# Patient Record
Sex: FEMALE | Race: WHITE | NOT HISPANIC OR LATINO | Employment: FULL TIME | URBAN - NONMETROPOLITAN AREA
[De-identification: names, ages, dates, MRNs, and addresses within clinical notes are randomized per-mention and may not be internally consistent; named-entity substitution may affect disease eponyms.]

---

## 2024-02-19 ENCOUNTER — OFFICE VISIT (OUTPATIENT)
Dept: PRIMARY CARE | Facility: CLINIC | Age: 34
End: 2024-02-19
Payer: COMMERCIAL

## 2024-02-19 VITALS
DIASTOLIC BLOOD PRESSURE: 73 MMHG | OXYGEN SATURATION: 95 % | SYSTOLIC BLOOD PRESSURE: 104 MMHG | WEIGHT: 153.2 LBS | BODY MASS INDEX: 28.19 KG/M2 | HEIGHT: 62 IN | HEART RATE: 79 BPM

## 2024-02-19 DIAGNOSIS — R11.0 NAUSEA: ICD-10-CM

## 2024-02-19 DIAGNOSIS — A60.04 HERPES SIMPLEX VULVOVAGINITIS: ICD-10-CM

## 2024-02-19 DIAGNOSIS — K52.9 CHRONIC DIARRHEA: Primary | ICD-10-CM

## 2024-02-19 PROCEDURE — 1036F TOBACCO NON-USER: CPT | Performed by: STUDENT IN AN ORGANIZED HEALTH CARE EDUCATION/TRAINING PROGRAM

## 2024-02-19 PROCEDURE — 99214 OFFICE O/P EST MOD 30 MIN: CPT | Performed by: STUDENT IN AN ORGANIZED HEALTH CARE EDUCATION/TRAINING PROGRAM

## 2024-02-19 RX ORDER — ONDANSETRON HYDROCHLORIDE 8 MG/1
8 TABLET, FILM COATED ORAL EVERY 8 HOURS PRN
Qty: 20 TABLET | Refills: 1 | Status: SHIPPED | OUTPATIENT
Start: 2024-02-19

## 2024-02-19 RX ORDER — ONDANSETRON HYDROCHLORIDE 8 MG/1
8 TABLET, FILM COATED ORAL EVERY 8 HOURS PRN
COMMUNITY
End: 2024-02-19 | Stop reason: SDUPTHER

## 2024-02-19 RX ORDER — PANCRELIPASE LIPASE, PANCRELIPASE AMYLASE, AND PANCRELIPASE PROTEASE 149900; 37000; 97300 [USP'U]/1; [USP'U]/1; [USP'U]/1
1 CAPSULE, DELAYED RELEASE ORAL 3 TIMES DAILY
Qty: 30 CAPSULE | Refills: 11 | Status: SHIPPED | OUTPATIENT
Start: 2024-02-19 | End: 2024-03-04 | Stop reason: ALTCHOICE

## 2024-02-19 RX ORDER — VALACYCLOVIR HYDROCHLORIDE 500 MG/1
500 TABLET, FILM COATED ORAL 2 TIMES DAILY
Qty: 6 TABLET | Refills: 0 | Status: SHIPPED | OUTPATIENT
Start: 2024-02-19 | End: 2024-02-19 | Stop reason: SDUPTHER

## 2024-02-19 RX ORDER — VALACYCLOVIR HYDROCHLORIDE 1 G/1
1000 TABLET, FILM COATED ORAL DAILY
Qty: 7 TABLET | Refills: 2 | Status: SHIPPED | OUTPATIENT
Start: 2024-02-19 | End: 2024-03-11

## 2024-02-19 ASSESSMENT — PATIENT HEALTH QUESTIONNAIRE - PHQ9
SUM OF ALL RESPONSES TO PHQ9 QUESTIONS 1 AND 2: 0
1. LITTLE INTEREST OR PLEASURE IN DOING THINGS: NOT AT ALL
2. FEELING DOWN, DEPRESSED OR HOPELESS: NOT AT ALL

## 2024-02-19 NOTE — PROGRESS NOTES
Subjective   Patient ID: Taylor Reinoso is a 33 y.o. female who presents for New PT (PT is here today to establish care as a new pt.).    HPI    Reports she has stomach issues, been told she has IBS. When she wakes up in the morning she has diarrhea and wakes up very nauseated. Has no appetite, and the thought of food makes her sick, been sick multiple times with GI upset. Every time it happens its worse than the time before. 2 weeks ago, started having nausea and a weird taste almost like metal, reports it kept getting worse and worse. For 12 hours she had diarrhea and vomiting back to back.    Fiber- every morning   Protein before caffeine   Cut back breads  Trying to drink more fluids - 16 Oz -Half gallon    Currently wants to get Zofran prescription. I suggested further eval.    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      Has history of genital herpes.  She takes valacyclovir with flareups.  Last flareup was a month ago.  Takes for 7 days.  Refill requested.  Sent refill.    Review of Systems  ROS negative except discussed above in HPI.    Vitals:    02/19/24 1616   BP: 104/73   Pulse: 79   SpO2: 95%     Objective   Physical Exam  Constitutional:       Appearance: Normal appearance.   Abdominal:      General: Abdomen is flat.      Palpations: Abdomen is soft.   Neurological:      Mental Status: She is alert.           Assessment/Plan   Taylor was seen today for new pt.  Diagnoses and all orders for this visit:  Chronic diarrhea (Primary)  -     Amylase; Future  -     Lipase; Future  -     Calprotectin Stool; Future  -     Stool Pathogen Panel, PCR  -     H. pylori antigen, stool; Future  -     lipase-protease-amylase (Pancreaze) 37,000-97,300- 149,900 unit capsule,delayed release(DR/EC); Take 1 capsule by mouth 3 times a day.  Nausea  -     ondansetron (Zofran) 8 mg tablet; Take 1 tablet (8 mg) by mouth every 8 hours if needed for nausea or vomiting.  Herpes simplex vulvovaginitis  -      valACYclovir (Valtrex) 1 gram tablet; Take 1 tablet (1,000 mg) by mouth once daily for 21 days.      Follow up as needed.          Delonte Esquivel MD MPH

## 2024-02-23 PROCEDURE — 87449 NOS EACH ORGANISM AG IA: CPT | Performed by: STUDENT IN AN ORGANIZED HEALTH CARE EDUCATION/TRAINING PROGRAM

## 2024-02-27 LAB — H PYLORI AG STL QL IA: NEGATIVE

## 2024-03-04 ENCOUNTER — OFFICE VISIT (OUTPATIENT)
Dept: PRIMARY CARE | Facility: CLINIC | Age: 34
End: 2024-03-04
Payer: COMMERCIAL

## 2024-03-04 VITALS
OXYGEN SATURATION: 97 % | SYSTOLIC BLOOD PRESSURE: 100 MMHG | BODY MASS INDEX: 28.3 KG/M2 | WEIGHT: 152.8 LBS | DIASTOLIC BLOOD PRESSURE: 60 MMHG | HEART RATE: 78 BPM

## 2024-03-04 DIAGNOSIS — K59.00 CONSTIPATION, UNSPECIFIED CONSTIPATION TYPE: Primary | ICD-10-CM

## 2024-03-04 DIAGNOSIS — K52.9 CHRONIC DIARRHEA: ICD-10-CM

## 2024-03-04 PROCEDURE — 1036F TOBACCO NON-USER: CPT | Performed by: STUDENT IN AN ORGANIZED HEALTH CARE EDUCATION/TRAINING PROGRAM

## 2024-03-04 PROCEDURE — 99213 OFFICE O/P EST LOW 20 MIN: CPT | Performed by: STUDENT IN AN ORGANIZED HEALTH CARE EDUCATION/TRAINING PROGRAM

## 2024-03-04 RX ORDER — PANCRELIPASE LIPASE, PANCRELIPASE AMYLASE, AND PANCRELIPASE PROTEASE 4200; 24600; 14200 [USP'U]/1; [USP'U]/1; [USP'U]/1
1 CAPSULE, DELAYED RELEASE ORAL
Qty: 30 CAPSULE | Refills: 0 | Status: SHIPPED | OUTPATIENT
Start: 2024-03-04 | End: 2024-05-20

## 2024-03-04 ASSESSMENT — PATIENT HEALTH QUESTIONNAIRE - PHQ9
1. LITTLE INTEREST OR PLEASURE IN DOING THINGS: NOT AT ALL
2. FEELING DOWN, DEPRESSED OR HOPELESS: NOT AT ALL
SUM OF ALL RESPONSES TO PHQ9 QUESTIONS 1 AND 2: 0

## 2024-03-04 NOTE — PROGRESS NOTES
Subjective   Patient ID: Taylor Reinoso is a 33 y.o. female who presents for Follow-up (P ).    HPI    T is here today for 2 week ov, was not able to get blood work done, due to her work schedule. Today has been the first day she has had a BM since Friday, the BM that was coming out was covered in a think layer of bright red blood with about 3-4 clots this morning. States the BM is formed in formed hard sharp. Has never had blood in stool before. after last appointment it was extreme pain when she went BM and that was the first time she noticed lots of blood and clots in her stool. Patient has tried fiber supplements, Dulcolax, stool softeners, suppositories and enemas in the past without significant improvement in her symptoms.  Prescribing Linzess to help with her constipation.    Review of Systems  ROS negative except discussed above in HPI.    Vitals:    03/04/24 1648   BP: 100/60   Pulse: 78   SpO2: 97%     Objective   Physical Exam  Constitutional:       Appearance: Normal appearance.   Neurological:      Mental Status: She is alert.           Assessment/Plan   Taylor was seen today for follow-up.  Diagnoses and all orders for this visit:  Constipation, unspecified constipation type (Primary)  -     lipase-protease-amylase (Pancreaze) 4,200-14,200- 24,600 unit capsule; Take 1 capsule by mouth 3 times a day with meals for 10 days.  -     linaCLOtide (Linzess) 145 mcg capsule; Take 1 capsule (145 mcg) by mouth once daily in the morning. Take before meals. Do not crush or chew.  Chronic diarrhea  -     H. pylori antigen, stool  Prescribing linzess  If not approved then can try amtiza    Follow up in 3 months.        kiran Esquivel MD MPH

## 2024-03-06 ENCOUNTER — LAB (OUTPATIENT)
Dept: LAB | Facility: LAB | Age: 34
End: 2024-03-06
Payer: COMMERCIAL

## 2024-03-06 DIAGNOSIS — K52.9 CHRONIC DIARRHEA: ICD-10-CM

## 2024-03-06 LAB
AMYLASE SERPL-CCNC: 41 U/L (ref 29–103)
LIPASE SERPL-CCNC: 13 U/L (ref 9–82)

## 2024-03-06 PROCEDURE — 82150 ASSAY OF AMYLASE: CPT

## 2024-03-06 PROCEDURE — 83690 ASSAY OF LIPASE: CPT

## 2024-03-06 PROCEDURE — 36415 COLL VENOUS BLD VENIPUNCTURE: CPT

## 2024-03-07 ENCOUNTER — TELEPHONE (OUTPATIENT)
Dept: PRIMARY CARE | Facility: CLINIC | Age: 34
End: 2024-03-07
Payer: COMMERCIAL

## 2024-03-07 NOTE — TELEPHONE ENCOUNTER
Silvina at [facility] stating the pancreaze only comes in a quantity of 100 and they don't break boxes. Asking for a call back to let them know if that quantity is ok. [phone]

## 2024-03-07 NOTE — TELEPHONE ENCOUNTER
Patient called in and stated that all of her bloodwork came back normal and was wondering if she should still take the pancreaze that was prescribed.    Thank

## 2024-03-12 ENCOUNTER — TELEPHONE (OUTPATIENT)
Dept: PRIMARY CARE | Facility: CLINIC | Age: 34
End: 2024-03-12
Payer: COMMERCIAL

## 2024-03-12 NOTE — TELEPHONE ENCOUNTER
Asking if the RX for the pancreaze can be changed to a quantity of 100. The only ones they are for a quantity of 100 and they don't break the boxes. Can call [phone].

## 2024-03-14 DIAGNOSIS — K52.9 CHRONIC DIARRHEA: ICD-10-CM

## 2024-03-14 DIAGNOSIS — R11.0 NAUSEA: Primary | ICD-10-CM

## 2024-03-14 RX ORDER — ONDANSETRON 4 MG/1
4 TABLET, ORALLY DISINTEGRATING ORAL EVERY 8 HOURS PRN
Qty: 20 TABLET | Refills: 0 | Status: SHIPPED | OUTPATIENT
Start: 2024-03-14 | End: 2024-03-21

## 2024-04-04 DIAGNOSIS — A60.04 HERPES SIMPLEX VULVOVAGINITIS: Primary | ICD-10-CM

## 2024-04-04 RX ORDER — VALACYCLOVIR HYDROCHLORIDE 500 MG/1
500 TABLET, FILM COATED ORAL 2 TIMES DAILY
Qty: 60 TABLET | Refills: 0 | Status: SHIPPED | OUTPATIENT
Start: 2024-04-04 | End: 2024-05-04

## 2024-04-04 RX ORDER — VALACYCLOVIR HYDROCHLORIDE 1 G/1
500 TABLET, FILM COATED ORAL 2 TIMES DAILY
COMMUNITY
Start: 2024-03-25 | End: 2024-04-04 | Stop reason: SDUPTHER

## 2024-05-20 ENCOUNTER — OFFICE VISIT (OUTPATIENT)
Dept: GASTROENTEROLOGY | Facility: CLINIC | Age: 34
End: 2024-05-20
Payer: COMMERCIAL

## 2024-05-20 VITALS — BODY MASS INDEX: 27.41 KG/M2 | HEIGHT: 61 IN | WEIGHT: 145.2 LBS

## 2024-05-20 DIAGNOSIS — R11.0 NAUSEA: ICD-10-CM

## 2024-05-20 DIAGNOSIS — K52.9 CHRONIC DIARRHEA: ICD-10-CM

## 2024-05-20 DIAGNOSIS — R19.7 DIARRHEA DUE TO MALABSORPTION (HHS-HCC): Primary | ICD-10-CM

## 2024-05-20 DIAGNOSIS — K90.9 DIARRHEA DUE TO MALABSORPTION (HHS-HCC): Primary | ICD-10-CM

## 2024-05-20 PROCEDURE — 99213 OFFICE O/P EST LOW 20 MIN: CPT | Performed by: INTERNAL MEDICINE

## 2024-05-20 PROCEDURE — 1036F TOBACCO NON-USER: CPT | Performed by: INTERNAL MEDICINE

## 2024-05-20 RX ORDER — L.ACIDOPH/B.ANIMALIS/B.LONGUM 15B CELL
1 CAPSULE ORAL DAILY
Qty: 90 CAPSULE | Refills: 3 | Status: SHIPPED | OUTPATIENT
Start: 2024-05-20 | End: 2025-05-20

## 2024-05-20 ASSESSMENT — ENCOUNTER SYMPTOMS
DIARRHEA: 1
ENDOCRINE NEGATIVE: 1
CONSTITUTIONAL NEGATIVE: 1
CARDIOVASCULAR NEGATIVE: 1
NAUSEA: 1
HEMATOLOGIC/LYMPHATIC NEGATIVE: 1
EYES NEGATIVE: 1
RESPIRATORY NEGATIVE: 1
ABDOMINAL PAIN: 1
NEUROLOGICAL NEGATIVE: 1

## 2024-05-20 NOTE — PROGRESS NOTES
Subjective   Patient ID: Taylor Reinoso is a 33 y.o. female who presents for Nausea (With vomiting starting in October n/v worse in the morning. Seen in the ED 2/6 ), Diarrhea, and Constipation.  Diarrhea   Associated symptoms include abdominal pain.   Constipation  Associated symptoms include abdominal pain, diarrhea and nausea.     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  History of diarrhea beginning back to adolescence.  Did get improvement with using over-the-counter fiber supplement was good for approximately 2 months and symptoms again recurred.  She is currently still on fiber averages 2-4 bowel movements daily with urgency some incontinence.  Denies any food triggers she is gone on dairy free and gluten-free diet without much improvement.  Admits abdominal cramping and bloating just before defecation.  Worse with stress or emotional triggers.  No prior colonoscopy.  Review of Systems   Constitutional: Negative.    HENT: Negative.     Eyes: Negative.    Respiratory: Negative.     Cardiovascular: Negative.    Gastrointestinal:  Positive for abdominal pain, diarrhea and nausea.   Endocrine: Negative.    Genitourinary: Negative.    Neurological: Negative.    Hematological: Negative.        Objective   Physical Exam  Vitals and nursing note reviewed.   Constitutional:       Appearance: Normal appearance.   HENT:      Head: Normocephalic.      Mouth/Throat:      Mouth: Mucous membranes are moist.      Pharynx: Oropharynx is clear.   Eyes:      Conjunctiva/sclera: Conjunctivae normal.      Pupils: Pupils are equal, round, and reactive to light.   Cardiovascular:      Pulses: Normal pulses.      Heart sounds: Normal heart sounds.   Pulmonary:      Effort: Pulmonary effort is normal.      Breath sounds: Normal breath sounds.   Abdominal:      General: Abdomen is flat. Bowel sounds are normal.      Palpations: Abdomen is soft.   Musculoskeletal:      Cervical back: Normal range of  motion and neck supple.   Skin:     General: Skin is warm and dry.   Neurological:      General: No focal deficit present.      Mental Status: She is alert and oriented to person, place, and time.   Psychiatric:         Behavior: Behavior normal.         Assessment/Plan   Diagnoses and all orders for this visit:  Nausea  -     Referral to Gastroenterology  -     Calprotectin, Fecal; Future  -     Comprehensive Metabolic Panel; Future  -     CBC and Auto Differential; Future  -     Pancreatic Elastase, Fecal; Future  -     Ova/Para + Giardia/Cryptosporidium Antigen; Future  -     Celiac Panel; Future  Chronic diarrhea  -     Referral to Gastroenterology  -     Calprotectin, Fecal; Future  -     Comprehensive Metabolic Panel; Future  -     CBC and Auto Differential; Future  -     Pancreatic Elastase, Fecal; Future  -     Ova/Para + Giardia/Cryptosporidium Antigen; Future  -     Celiac Panel; Future    Recommend we proceed with stool testing if negative begin Xifaxan 550 mg 3 times daily for 14 days for diarrhea predominant IBS will place on FODMAP diet as well follow-up in 2 months       Dilip Temple DO 05/20/24 1:02 PM

## 2024-05-22 ENCOUNTER — LAB (OUTPATIENT)
Dept: LAB | Facility: LAB | Age: 34
End: 2024-05-22
Payer: COMMERCIAL

## 2024-05-22 DIAGNOSIS — R11.0 NAUSEA: ICD-10-CM

## 2024-05-22 DIAGNOSIS — K52.9 CHRONIC DIARRHEA: ICD-10-CM

## 2024-05-22 LAB
ALBUMIN SERPL BCP-MCNC: 4.2 G/DL (ref 3.4–5)
ALP SERPL-CCNC: 47 U/L (ref 33–110)
ALT SERPL W P-5'-P-CCNC: 33 U/L (ref 7–45)
ANION GAP SERPL CALC-SCNC: 10 MMOL/L (ref 10–20)
AST SERPL W P-5'-P-CCNC: 22 U/L (ref 9–39)
BASOPHILS # BLD AUTO: 0.01 X10*3/UL (ref 0–0.1)
BASOPHILS NFR BLD AUTO: 0.2 %
BILIRUB SERPL-MCNC: 0.5 MG/DL (ref 0–1.2)
BUN SERPL-MCNC: 12 MG/DL (ref 6–23)
CALCIUM SERPL-MCNC: 9.1 MG/DL (ref 8.6–10.3)
CHLORIDE SERPL-SCNC: 104 MMOL/L (ref 98–107)
CO2 SERPL-SCNC: 27 MMOL/L (ref 21–32)
CREAT SERPL-MCNC: 0.86 MG/DL (ref 0.5–1.05)
EGFRCR SERPLBLD CKD-EPI 2021: >90 ML/MIN/1.73M*2
EOSINOPHIL # BLD AUTO: 0.08 X10*3/UL (ref 0–0.7)
EOSINOPHIL NFR BLD AUTO: 1.7 %
ERYTHROCYTE [DISTWIDTH] IN BLOOD BY AUTOMATED COUNT: 12.4 % (ref 11.5–14.5)
GLUCOSE SERPL-MCNC: 89 MG/DL (ref 74–99)
HCT VFR BLD AUTO: 39.9 % (ref 36–46)
HGB BLD-MCNC: 13.3 G/DL (ref 12–16)
IMM GRANULOCYTES # BLD AUTO: 0.03 X10*3/UL (ref 0–0.7)
IMM GRANULOCYTES NFR BLD AUTO: 0.7 % (ref 0–0.9)
LYMPHOCYTES # BLD AUTO: 1.13 X10*3/UL (ref 1.2–4.8)
LYMPHOCYTES NFR BLD AUTO: 24.6 %
MCH RBC QN AUTO: 30.7 PG (ref 26–34)
MCHC RBC AUTO-ENTMCNC: 33.3 G/DL (ref 32–36)
MCV RBC AUTO: 92 FL (ref 80–100)
MONOCYTES # BLD AUTO: 0.39 X10*3/UL (ref 0.1–1)
MONOCYTES NFR BLD AUTO: 8.5 %
NEUTROPHILS # BLD AUTO: 2.96 X10*3/UL (ref 1.2–7.7)
NEUTROPHILS NFR BLD AUTO: 64.3 %
NRBC BLD-RTO: 0 /100 WBCS (ref 0–0)
PLATELET # BLD AUTO: 229 X10*3/UL (ref 150–450)
POTASSIUM SERPL-SCNC: 4 MMOL/L (ref 3.5–5.3)
PROT SERPL-MCNC: 6.9 G/DL (ref 6.4–8.2)
RBC # BLD AUTO: 4.33 X10*6/UL (ref 4–5.2)
SODIUM SERPL-SCNC: 137 MMOL/L (ref 136–145)
WBC # BLD AUTO: 4.6 X10*3/UL (ref 4.4–11.3)

## 2024-05-22 PROCEDURE — 80053 COMPREHEN METABOLIC PANEL: CPT

## 2024-05-22 PROCEDURE — 83516 IMMUNOASSAY NONANTIBODY: CPT

## 2024-05-22 PROCEDURE — 85025 COMPLETE CBC W/AUTO DIFF WBC: CPT

## 2024-05-22 PROCEDURE — 36415 COLL VENOUS BLD VENIPUNCTURE: CPT

## 2024-05-23 LAB
GLIADIN PEPTIDE IGA SER IA-ACNC: <1 U/ML
TTG IGA SER IA-ACNC: <1 U/ML

## 2024-05-24 LAB
GLIADIN PEPTIDE IGG SER IA-ACNC: <0.56 FLU (ref 0–4.99)
TTG IGG SER IA-ACNC: <0.82 FLU (ref 0–4.99)

## 2024-05-29 ENCOUNTER — LAB (OUTPATIENT)
Dept: LAB | Facility: LAB | Age: 34
End: 2024-05-29
Payer: COMMERCIAL

## 2024-05-29 DIAGNOSIS — K52.9 CHRONIC DIARRHEA: ICD-10-CM

## 2024-05-29 DIAGNOSIS — R11.0 NAUSEA: ICD-10-CM

## 2024-05-29 PROCEDURE — 87329 GIARDIA AG IA: CPT

## 2024-05-29 PROCEDURE — 82653 EL-1 FECAL QUANTITATIVE: CPT

## 2024-05-29 PROCEDURE — 83993 ASSAY FOR CALPROTECTIN FECAL: CPT

## 2024-05-29 PROCEDURE — 87328 CRYPTOSPORIDIUM AG IA: CPT

## 2024-05-31 LAB
CRYPTOSP AG STL QL IA: NEGATIVE
G LAMBLIA AG STL QL IA: NEGATIVE

## 2024-06-03 LAB
CALPROTECTIN STL-MCNT: 25 UG/G
ELASTASE PANC STL-MCNT: >800 UG/G
O+P STL MICRO: NEGATIVE

## 2024-06-14 ENCOUNTER — TELEPHONE (OUTPATIENT)
Dept: GASTROENTEROLOGY | Facility: CLINIC | Age: 34
End: 2024-06-14
Payer: COMMERCIAL

## 2024-06-14 NOTE — TELEPHONE ENCOUNTER
6/4 LVM  6/13 No VM  6/14 VM full    ----- Message from Dilip Temple DO sent at 5/29/2024  7:41 AM EDT -----  Please let Taylor know that her celiac panel is negative

## 2024-07-24 DIAGNOSIS — A60.04 HERPES SIMPLEX VULVOVAGINITIS: Primary | ICD-10-CM

## 2024-07-24 RX ORDER — VALACYCLOVIR HYDROCHLORIDE 500 MG/1
500 TABLET, FILM COATED ORAL 2 TIMES DAILY
Qty: 60 TABLET | Refills: 0 | Status: SHIPPED | OUTPATIENT
Start: 2024-07-24 | End: 2024-08-23

## 2024-08-05 ENCOUNTER — APPOINTMENT (OUTPATIENT)
Dept: GASTROENTEROLOGY | Facility: CLINIC | Age: 34
End: 2024-08-05
Payer: COMMERCIAL

## 2024-09-04 ENCOUNTER — OFFICE VISIT (OUTPATIENT)
Dept: URGENT CARE | Facility: CLINIC | Age: 34
End: 2024-09-04
Payer: COMMERCIAL

## 2024-09-04 VITALS
WEIGHT: 154 LBS | TEMPERATURE: 97.9 F | HEART RATE: 74 BPM | RESPIRATION RATE: 16 BRPM | OXYGEN SATURATION: 97 % | HEIGHT: 62 IN | BODY MASS INDEX: 28.34 KG/M2 | SYSTOLIC BLOOD PRESSURE: 104 MMHG | DIASTOLIC BLOOD PRESSURE: 60 MMHG

## 2024-09-04 DIAGNOSIS — R53.83 FATIGUE, UNSPECIFIED TYPE: ICD-10-CM

## 2024-09-04 DIAGNOSIS — R05.1 ACUTE COUGH: ICD-10-CM

## 2024-09-04 DIAGNOSIS — J06.9 ACUTE URI: Primary | ICD-10-CM

## 2024-09-04 LAB
POC CORONAVIRUS 2019 BY PCR (COV19): NOT DETECTED
POC FLU A RESULT: NOT DETECTED
POC FLU B RESULT: NOT DETECTED
POC RSV PCR: NOT DETECTED

## 2024-09-04 PROCEDURE — 99213 OFFICE O/P EST LOW 20 MIN: CPT

## 2024-09-04 RX ORDER — AZITHROMYCIN 250 MG/1
TABLET, FILM COATED ORAL
Qty: 6 TABLET | Refills: 0 | Status: SHIPPED | OUTPATIENT
Start: 2024-09-04 | End: 2024-09-09

## 2024-09-04 RX ORDER — BROMPHENIRAMINE MALEATE, PSEUDOEPHEDRINE HYDROCHLORIDE, AND DEXTROMETHORPHAN HYDROBROMIDE 2; 30; 10 MG/5ML; MG/5ML; MG/5ML
2.5 SYRUP ORAL 4 TIMES DAILY PRN
Qty: 120 ML | Refills: 0 | Status: SHIPPED | OUTPATIENT
Start: 2024-09-04 | End: 2024-09-14

## 2024-09-04 NOTE — PROGRESS NOTES
[facility] URGENT CARE [facility] NOTE:      Name: Taylor Reinoso, 34 y.o.    CSN:[id number]   MRN:[id number]    PCP: Delonte Esquivel MD MPH    ALL:    Allergies   Allergen Reactions    Ranitidine Hives    Metoclopramide Hcl Rash       History:    Chief Complaint: Cough (Pt states cough since Friday, pt states fatigue, light headed.)    Encounter Date: 9/4/2024      HPI: The history was obtained from the patient. Taylor is a 34 y.o. female, who presents with a chief complaint of Cough (Pt states cough since Friday, pt states fatigue, light headed.).  Patient complains of cough x 7 days.  She states symptoms began on Thursday where she had a cough.  She states her symptoms seem to improve over Saturday and Sunday and she thought she was getting better.  She states on Monday all of her symptoms came back.  She states the cough is dry.  She endorses fatigue, slight nasal congestion.  She denies feeling lightheaded or dizzy but more so feels like there is so much pressure around her face she feels like she has brain fog and cannot concentrate.  She states overnight last night she did not take her temperature but she felt fevered as she had the sweats and chills.  She has been taking over-the-counter Mucinex which has improved her symptoms.  She denies headaches, nausea, vomiting, sore throat, chest pain, shortness of breath, abdominal pain.    PMHx:    Past Medical History:   Diagnosis Date    Anxiety     Chronic constipation N/a    Chronic diarrhea N/a    Depression     IBS (irritable bowel syndrome)               Current Outpatient Medications   Medication Sig Dispense Refill    azithromycin (Zithromax Z-Kyle) 250 mg tablet Take 2 tablets (500 mg) on  Day 1,  followed by 1 tablet (250 mg) once daily on Days 2 through 5. 6 tablet 0    brompheniramine-pseudoeph-DM 2-30-10 mg/5 mL syrup Take 2.5 mL by mouth 4 times a day as needed for cough or congestion for up to 10 days. 120 mL 0     L.acidoph-B.animalis-B.longum (Florajen Digestion) 15 billion cell capsule Take 1 capsule by mouth once daily. 90 capsule 3    ondansetron (Zofran) 8 mg tablet Take 1 tablet (8 mg) by mouth every 8 hours if needed for nausea or vomiting. 20 tablet 1     No current facility-administered medications for this visit.         PMSx:    Past Surgical History:   Procedure Laterality Date    HYSTERECTOMY      TUBAL LIGATION         Fam Hx:   Family History   Problem Relation Name Age of Onset    Anxiety disorder Mother      Depression Mother      Anxiety disorder Father Vidal Kidd     Depression Father Vidal Kidd     Alcohol abuse Father Vidal Kidd     Diabetes type II Sister      Cancer Paternal Grandfather         SOC. Hx:     Social History     Socioeconomic History    Marital status:      Spouse name: Not on file    Number of children: Not on file    Years of education: Not on file    Highest education level: Not on file   Occupational History    Not on file   Tobacco Use    Smoking status: Former     Types: Cigarettes     Passive exposure: Current    Smokeless tobacco: Never   Vaping Use    Vaping status: Never Used   Substance and Sexual Activity    Alcohol use: Yes     Comment: occasional    Drug use: Never    Sexual activity: Not Currently     Partners: Male   Other Topics Concern    Not on file   Social History Narrative    Not on file     Social Determinants of Health     Financial Resource Strain: Not on file   Food Insecurity: Not on file   Transportation Needs: Not on file   Physical Activity: Not on file   Stress: Not on file   Social Connections: Not on file   Intimate Partner Violence: Not on file   Housing Stability: Not on file         Vitals:    09/04/24 0945   BP: 104/60   Pulse: 74   Resp: 16   Temp: 36.6 °C (97.9 °F)   SpO2: 97%     69.9 kg (154 lb)          Physical Exam  Vitals reviewed.   Constitutional:       General: She is not in acute distress.     Appearance: Normal appearance.  She is ill-appearing.   HENT:      Right Ear: Tympanic membrane, ear canal and external ear normal.      Left Ear: Tympanic membrane, ear canal and external ear normal.      Nose: Nose normal. No congestion or rhinorrhea.      Mouth/Throat:      Mouth: Mucous membranes are moist.      Pharynx: Oropharynx is clear. Postnasal drip present. No posterior oropharyngeal erythema.   Eyes:      Extraocular Movements: Extraocular movements intact.      Conjunctiva/sclera: Conjunctivae normal.      Pupils: Pupils are equal, round, and reactive to light.   Cardiovascular:      Rate and Rhythm: Normal rate and regular rhythm.      Pulses: Normal pulses.      Heart sounds: Normal heart sounds.   Pulmonary:      Effort: Pulmonary effort is normal. No respiratory distress.      Breath sounds: Normal breath sounds. No stridor. No wheezing, rhonchi or rales.   Abdominal:      General: Abdomen is flat.      Palpations: Abdomen is soft.   Musculoskeletal:      Cervical back: Full passive range of motion without pain, normal range of motion and neck supple.   Lymphadenopathy:      Cervical: Cervical adenopathy present.      Right cervical: Superficial cervical adenopathy present.      Left cervical: Superficial cervical adenopathy present.   Skin:     General: Skin is warm.      Capillary Refill: Capillary refill takes less than 2 seconds.      Findings: No rash.   Neurological:      General: No focal deficit present.      Mental Status: She is alert and oriented to person, place, and time.      GCS: GCS eye subscore is 4. GCS verbal subscore is 5. GCS motor subscore is 6.      Cranial Nerves: Cranial nerves 2-12 are intact.      Gait: Gait is intact.   Psychiatric:         Attention and Perception: Attention normal.         Mood and Affect: Mood normal.         Speech: Speech normal.         Behavior: Behavior normal.         Thought Content: Thought content normal.       I did personally review Taylor's past medical history,  surgical history, social history, as well as family history (when relevant).  In this case, I also oversaw the her drug management by reviewing her medication list, allergy list, as well as the medications that I prescribed during the UC course and/or recommended as an out-patient (including possible OTC medications such as acetaminophen, NSAIDs , etc).    After reviewing the items above, I did look at previous medical documentation, such as recent hospitalizations, office visits, and/or recent consultations with PCP/specialist.                          SDOH:   Another factor that I considered in Taylor's care was her Social Determinants of Health (SDOH). During this UC encounter, she did not have social determinants of health. Those SDOH influencing Taylor's care are: none    LABORATORY @ RADIOLOGICAL IMAGING (if done):     Results for orders placed or performed in visit on 09/04/24 (from the past 24 hour(s))   POCT SARS-COV-2/FLU/RSV PCR SYMPTOMATIC manually resulted   Result Value Ref Range    POC Coronavirus 2019, PCR Not Detected Not Detected    POC Flu A Result Not Detected Not Detected    POC Flu B Result Not Detected Not Detected    POC RSV PCR Not Detected Not Detected       UC COURSE/MEDICAL DECISION MAKING:    Taylor is a 34 y.o., who presents with a working diagnosis of   1. Acute URI    2. Acute cough    3. Fatigue, unspecified type      Taylor was seen today for cough.  Diagnoses and all orders for this visit:  Acute URI (Primary)  -     azithromycin (Zithromax Z-Kyle) 250 mg tablet; Take 2 tablets (500 mg) on  Day 1,  followed by 1 tablet (250 mg) once daily on Days 2 through 5.  Acute cough  -     POCT SARS-COV-2/FLU/RSV PCR SYMPTOMATIC manually resulted  -     brompheniramine-pseudoeph-DM 2-30-10 mg/5 mL syrup; Take 2.5 mL by mouth 4 times a day as needed for cough or congestion for up to 10 days.  Fatigue, unspecified type    After my independent evaluation, she appears to have a illness  "likely due to a URI. At this time, there is a no evidence of pneumonia, hypoxia, OM, bacterial bronchitis, bacteremia, or sepsis.     As we discussed, she is to take the antibiotic as directed and is to report to the ER immediately if there is any worsening of her condition, such as increased cough, shortness of breath, persistent fevers, repeated vomiting, dehydration, or if her condition worsens at all.      Radha Redomnd PA-C   Advanced Practice Provider  [facility] URGENT CARE    Please note: Portions of this chart may have been created with Dragon voice recognition software. Occasional wrong-word or \"sound-like\" substitutions may have occurred due to inherent limitations of the voice recognition software. Please excuse any typographical or grammatical errors contained herein. Please read the chart carefully and recognize, using context, where the substitutions have occurred.   "

## 2024-09-04 NOTE — LETTER
September 4, 2024     Patient: Taylor Reinoso   YOB: 1990   Date of Visit: 9/4/2024       To Whom It May Concern:    Taylor Reinoso was seen in my clinic on 9/4/2024 at 9:45 am. Please excuse Taylor for her absence from work on 9/3/24 and 9/4/24. She may return to work on 9/5/24.    If you have any questions or concerns, please don't hesitate to call.         Sincerely,         Anabel Redmond PA-C        CC: No Recipients

## 2024-10-24 ENCOUNTER — TELEPHONE (OUTPATIENT)
Dept: PRIMARY CARE | Facility: CLINIC | Age: 34
End: 2024-10-24
Payer: COMMERCIAL

## 2024-10-24 DIAGNOSIS — A60.04 HERPES SIMPLEX VULVOVAGINITIS: ICD-10-CM

## 2024-10-24 RX ORDER — VALACYCLOVIR HYDROCHLORIDE 500 MG/1
500 TABLET, FILM COATED ORAL 2 TIMES DAILY
Qty: 60 TABLET | Refills: 0 | Status: SHIPPED | OUTPATIENT
Start: 2024-10-24 | End: 2024-11-23

## 2024-11-25 ENCOUNTER — TELEPHONE (OUTPATIENT)
Dept: PRIMARY CARE | Facility: CLINIC | Age: 34
End: 2024-11-25
Payer: COMMERCIAL

## 2024-11-25 DIAGNOSIS — A60.04 HERPES SIMPLEX VULVOVAGINITIS: ICD-10-CM

## 2024-11-25 RX ORDER — VALACYCLOVIR HYDROCHLORIDE 500 MG/1
500 TABLET, FILM COATED ORAL 2 TIMES DAILY
Qty: 60 TABLET | Refills: 0 | Status: SHIPPED | OUTPATIENT
Start: 2024-11-25 | End: 2024-12-25

## 2024-11-25 NOTE — TELEPHONE ENCOUNTER
This will be the last refill she will need to make an appt with someone and establish care with new provider.

## 2024-12-31 DIAGNOSIS — A60.04 HERPES SIMPLEX VULVOVAGINITIS: ICD-10-CM

## 2024-12-31 DIAGNOSIS — R11.0 NAUSEA: ICD-10-CM

## 2024-12-31 RX ORDER — VALACYCLOVIR HYDROCHLORIDE 500 MG/1
500 TABLET, FILM COATED ORAL 2 TIMES DAILY
Qty: 60 TABLET | Refills: 0 | OUTPATIENT
Start: 2024-12-31 | End: 2025-01-30

## 2024-12-31 RX ORDER — ONDANSETRON HYDROCHLORIDE 8 MG/1
8 TABLET, FILM COATED ORAL EVERY 8 HOURS PRN
Qty: 20 TABLET | Refills: 1 | OUTPATIENT
Start: 2024-12-31

## 2024-12-31 NOTE — TELEPHONE ENCOUNTER
I called the patient and let her know she needs to schedule an appointment. She is expecting a call to schedule it.

## 2024-12-31 NOTE — TELEPHONE ENCOUNTER
Patient is scheduled for NP visit with Arturo on 01/14/25. Patient is out of refills and is need of refill of Valtrex 500mg. She wants to know if you are able to call in refill to get her from now until her appt. She needs sent to [facility] in [place] ([address]     Please contact patient to advise.

## 2025-01-14 ENCOUNTER — APPOINTMENT (OUTPATIENT)
Dept: PRIMARY CARE | Facility: CLINIC | Age: 35
End: 2025-01-14
Payer: COMMERCIAL

## 2025-01-14 VITALS
SYSTOLIC BLOOD PRESSURE: 110 MMHG | HEART RATE: 76 BPM | HEIGHT: 62 IN | DIASTOLIC BLOOD PRESSURE: 78 MMHG | BODY MASS INDEX: 27.58 KG/M2 | WEIGHT: 149.9 LBS | OXYGEN SATURATION: 99 %

## 2025-01-14 DIAGNOSIS — R11.0 NAUSEA: ICD-10-CM

## 2025-01-14 DIAGNOSIS — B00.9 HERPES: Primary | ICD-10-CM

## 2025-01-14 PROCEDURE — 99213 OFFICE O/P EST LOW 20 MIN: CPT

## 2025-01-14 PROCEDURE — 1036F TOBACCO NON-USER: CPT

## 2025-01-14 PROCEDURE — 3008F BODY MASS INDEX DOCD: CPT

## 2025-01-14 RX ORDER — ONDANSETRON 4 MG/1
4 TABLET, ORALLY DISINTEGRATING ORAL EVERY 8 HOURS PRN
Qty: 20 TABLET | Refills: 1 | Status: SHIPPED | OUTPATIENT
Start: 2025-01-14 | End: 2025-01-27

## 2025-01-14 RX ORDER — VALACYCLOVIR HYDROCHLORIDE 500 MG/1
500 TABLET, FILM COATED ORAL DAILY
Qty: 90 TABLET | Refills: 3 | Status: SHIPPED | OUTPATIENT
Start: 2025-01-14 | End: 2026-01-14

## 2025-01-14 NOTE — PROGRESS NOTES
"Subjective   Patient ID: Taylor Reinoso is a 34 y.o. female who presents for Establish Care (Pt is here today to establish care and get some medication refills. ).    Patient presents today to establish primary care.  Was previously patient of Dr. Hines's in this office.    Genital herpes: Chronic infection, minimal outbreaks.  First diagnosed in 2013.  Daily Valtrex prescribed.    IBS combination C&D: Patient has long history with gastroenterology, dietary modifications states she is able to control disease with intuitive eating.  Does take as needed Zofran for acute nausea.    Lumbosacral pain: Works in office, wears dress shoes experiencing some right lower lumbar pain and inflammation, declines muscle relaxer.  Did talk about use of ice stretching and acupressure.  Patient will consider massotherapy.  Also consider heel insert as right leg appears to be shorter than the left causing hip misalignment.           Review of Systems   Constitutional:  Negative for chills, diaphoresis, fatigue and unexpected weight change.   HENT:  Negative for dental problem, tinnitus and trouble swallowing.    Eyes:  Negative for visual disturbance.   Respiratory:  Negative for chest tightness and shortness of breath.    Cardiovascular:  Negative for chest pain, palpitations and leg swelling.   Gastrointestinal:  Negative for abdominal pain, constipation, diarrhea, nausea and rectal pain.   Endocrine: Negative for polydipsia, polyphagia and polyuria.   Genitourinary:  Negative for difficulty urinating, frequency and urgency.   Musculoskeletal:  Positive for back pain. Negative for arthralgias and myalgias.   Skin:  Negative for pallor and wound.   Neurological:  Negative for syncope, weakness, numbness and headaches.   Psychiatric/Behavioral:  Negative for suicidal ideas. The patient is not nervous/anxious.        Objective   /78   Pulse 76   Ht 1.565 m (5' 1.6\")   Wt 68 kg (149 lb 14.4 oz)   SpO2 99%   BMI 27.77 " kg/m²     Physical Exam  Vitals and nursing note reviewed.   Constitutional:       General: She is not in acute distress.     Appearance: Normal appearance. She is normal weight.   HENT:      Head: Normocephalic.      Nose: Nose normal.      Mouth/Throat:      Mouth: Mucous membranes are moist.      Pharynx: Oropharynx is clear.   Eyes:      General: No scleral icterus.     Pupils: Pupils are equal, round, and reactive to light.   Cardiovascular:      Rate and Rhythm: Normal rate and regular rhythm.      Pulses: Normal pulses.      Heart sounds: Normal heart sounds.   Pulmonary:      Effort: Pulmonary effort is normal.      Breath sounds: Normal breath sounds.   Abdominal:      General: Bowel sounds are normal. There is no distension.      Palpations: Abdomen is soft.   Musculoskeletal:         General: Normal range of motion.      Cervical back: Normal range of motion.   Skin:     General: Skin is warm and dry.      Capillary Refill: Capillary refill takes less than 2 seconds.      Coloration: Skin is not jaundiced.   Neurological:      General: No focal deficit present.      Mental Status: She is alert and oriented to person, place, and time. Mental status is at baseline.   Psychiatric:         Mood and Affect: Mood normal.         Behavior: Behavior normal.         Thought Content: Thought content normal.         Judgment: Judgment normal.         Assessment/Plan   Diagnoses and all orders for this visit:  Herpes  -     valACYclovir (Valtrex) 500 mg tablet; Take 1 tablet (500 mg) by mouth once daily.  Nausea  -     ondansetron ODT (Zofran-ODT) 4 mg disintegrating tablet; Dissolve 1 tablet (4 mg) in the mouth every 8 hours if needed for nausea or vomiting for up to 13 days.

## 2025-01-15 ASSESSMENT — ENCOUNTER SYMPTOMS
HEADACHES: 0
DIARRHEA: 0
TROUBLE SWALLOWING: 0
BACK PAIN: 1
DIFFICULTY URINATING: 0
NERVOUS/ANXIOUS: 0
RECTAL PAIN: 0
POLYDIPSIA: 0
MYALGIAS: 0
FREQUENCY: 0
UNEXPECTED WEIGHT CHANGE: 0
ARTHRALGIAS: 0
CHILLS: 0
NUMBNESS: 0
SHORTNESS OF BREATH: 0
CONSTIPATION: 0
WEAKNESS: 0
DIAPHORESIS: 0
POLYPHAGIA: 0
ABDOMINAL PAIN: 0
FATIGUE: 0
NAUSEA: 0
WOUND: 0
PALPITATIONS: 0
CHEST TIGHTNESS: 0

## 2025-03-18 DIAGNOSIS — M54.50 ACUTE LOW BACK PAIN WITHOUT SCIATICA, UNSPECIFIED BACK PAIN LATERALITY: Primary | ICD-10-CM

## 2025-04-10 ENCOUNTER — HOSPITAL ENCOUNTER (OUTPATIENT)
Dept: RADIOLOGY | Facility: CLINIC | Age: 35
Discharge: HOME | End: 2025-04-10
Payer: COMMERCIAL

## 2025-04-10 DIAGNOSIS — M54.50 ACUTE LOW BACK PAIN WITHOUT SCIATICA, UNSPECIFIED BACK PAIN LATERALITY: ICD-10-CM

## 2025-04-10 PROCEDURE — 72100 X-RAY EXAM L-S SPINE 2/3 VWS: CPT

## 2025-04-16 ENCOUNTER — TELEPHONE (OUTPATIENT)
Dept: PRIMARY CARE | Facility: CLINIC | Age: 35
End: 2025-04-16
Payer: COMMERCIAL

## 2025-04-16 DIAGNOSIS — M54.16 LUMBAR RADICULOPATHY: Primary | ICD-10-CM

## 2025-04-25 ENCOUNTER — APPOINTMENT (OUTPATIENT)
Dept: PHYSICAL THERAPY | Facility: CLINIC | Age: 35
End: 2025-04-25
Payer: COMMERCIAL

## 2025-04-25 NOTE — PROGRESS NOTES
Physical Therapy                 Therapy Communication Note    Patient Name: Taylor Reinoso  MRN: [id number]  Department:   Room: Room/bed info not found  Today's Date: 4/25/2025     Discipline: Physical Therapy          Missed Visit Reason:      Missed Time: Cancel    Comment:

## 2025-05-06 ENCOUNTER — EVALUATION (OUTPATIENT)
Dept: PHYSICAL THERAPY | Facility: CLINIC | Age: 35
End: 2025-05-06
Payer: COMMERCIAL

## 2025-05-06 DIAGNOSIS — M54.16 LUMBAR RADICULOPATHY: Primary | ICD-10-CM

## 2025-05-06 PROCEDURE — 97161 PT EVAL LOW COMPLEX 20 MIN: CPT | Mod: GP

## 2025-05-06 PROCEDURE — 97110 THERAPEUTIC EXERCISES: CPT | Mod: GP

## 2025-05-06 ASSESSMENT — ENCOUNTER SYMPTOMS
OCCASIONAL FEELINGS OF UNSTEADINESS: 0
LOSS OF SENSATION IN FEET: 0
DEPRESSION: 0

## 2025-05-06 ASSESSMENT — PAIN - FUNCTIONAL ASSESSMENT: PAIN_FUNCTIONAL_ASSESSMENT: 0-10

## 2025-05-06 ASSESSMENT — PAIN SCALES - GENERAL: PAINLEVEL_OUTOF10: 8

## 2025-05-06 ASSESSMENT — PATIENT HEALTH QUESTIONNAIRE - PHQ9
2. FEELING DOWN, DEPRESSED OR HOPELESS: NOT AT ALL
1. LITTLE INTEREST OR PLEASURE IN DOING THINGS: NOT AT ALL
SUM OF ALL RESPONSES TO PHQ9 QUESTIONS 1 AND 2: 0

## 2025-05-06 NOTE — PROGRESS NOTES
Physical Therapy          Physical Therapy Evaluation          Patient Name: Taylor Reinoso     MRN: [id number]     : 1990     Referring Physician: Arturo Barrera     Today's Date: 2025     Time Calculation  Start Time: 1700  Stop Time: 1750  Time Calculation (min): 50 min     PT Evaluation Time Entry  PT Evaluation (Low) Time Entry: 35     PT Therapeutic Procedures Time Entry  Therapeutic Exercise Time Entry: 15                       General     Reason for Referral: Back pain  Referred By: JACOBO Pennington-CNP          Current Problem     Problem List Items Addressed This Visit    None  Visit Diagnoses         Codes      Lumbar radiculopathy     M54.16                     SUBJECTIVE     Subjective      Patient reports back pain c paresthesias into RLE that began roughly 10 years ago. This is leading to difficulty with sleep, work, hobbies, ADLs.  Pain is reported to range 4-9/10 with daily activities.  Patient states that their goal is to be able to sleep, participate in hobbies, and work without significant pain with physical therapy intervention.          Prior level of function: Independent          Patient's name and  were confirmed this date.             Precautions     Precautions  STEADI Fall Risk Score (The score of 4 or more indicates an increased risk of falling): 0  Precautions Comment: Avoid peripheralization of symptoms, avoid forced/excessive flexion                Pain     Pain Assessment: 0-10  0-10 (Numeric) Pain Score: 8  Pain Type: Acute pain, Neuropathic pain  Pain Location: Back  Pain Orientation: Right  Pain Radiating Towards: RLE  Pain Descriptors: Aching, Burning, Cramping, Tingling, Numbness  Pain Frequency: Constant/continuous  Pain Onset: Gradual  Clinical Progression: Gradually worsening  Effect of Pain on Daily Activities: Decreased ability to sleep, perform ADLs, hobbies, work  Patient's Stated Pain Goal: No pain               OBJECTIVE:       Lower Extremity Strength:?  "  MMT 5/5 max??  RIGHT?  LEFT?    Hip Abduction?  ? 4- (pain) ?    Hip ER?  ?  ?    Hip IR?  ?  ?    Hip Flexion?  ?? 3+ (pain) ??    Hip Ext.?  ??  ??    ?Hip Add.?  ??  ??    ?Knee Flexion?  ??  ??    ?Knee Ext.??  ?? 4- (pain) ??    Dorsiflexion?  ? 4- ?    Plantar Flexion?  ?  ?    Eversion?  ?  ?    Inversion?  ?  ?    ?  ?  ?    ?   ?   Spinal AROM?: unable to assess due to pain in all planes c initiation of movement.   ?  RIGHT?  LEFT?    Rotation?  ? ° ? °   Lateral flexion?  ? ° ? °   Flexion?  ? °   Extension?  ? °   ?    Sensation: diminished recall of R L4 and L5 dermatomes     Posture: increased thoracic kyphosis          Palpation: very TTP of glute med., paraspinals          Special tests:     Slump test: positive  SLR test: positive  Manual traction: no change         Other Measures  Oswestry Disablity Index (HUMBERTO): 20/50             TREATMENT:       EXERCISES   LTR (very small range) x5 each  BKFO x10 each (very small range on R)   Open book x5 (felt good first rep, then more painful)  Wall spinal ext. x5  Scap. Retraction x10        Manual   Manual traction x60\"  Piriformis stretch x30\" on R  Glute med. STM x3'        HEP   Access Code: [email or web address]  URL: [email or web address]  Date: 05/06/2025  Prepared by: Mikael Tomlinson    Exercises  - Supine Lower Trunk Rotation  - 2 x daily - 7 x weekly - 2 sets - 10 reps - 5\" hold  - Bent Knee Fallouts  - 2 x daily - 7 x weekly - 2 sets - 10 reps  - Standing Lumbar Extension at Wall  - 2 x daily - 7 x weekly - 1 sets - 10 reps  - Sidelying Open Book  - 2 x daily - 7 x weekly - 1 sets - 10 reps - 5\" hold  - Seated Scapular Retraction  - 2 x daily - 7 x weekly - 1 sets - 10 reps  - Seated Thoracic Lumbar Extension  - 2 x daily - 7 x weekly - 1 sets - 10 reps - 5\" hold        PATIENT EDUCATION:   Outpatient Education  Individual(s) Educated: Patient  Education Provided: Anatomy, Body Mechanics, Home Exercise Program, Physiology, POC, " Posture  Risk and Benefits Discussed with Patient/Caregiver/Other: yes  Patient/Caregiver Demonstrated Understanding: yes  Plan of Care Discussed and Agreed Upon: yes  Patient Response to Education: Patient/Caregiver Verbalized Understanding of Information            ASSESSEMENT     PT Assessment  PT Assessment Results: Decreased strength, Decreased range of motion, Impaired balance, Decreased mobility, Impaired sensation, Pain  Rehab Prognosis: Fair (Pt's pain increases c all basic mobility)  Evaluation/Treatment Tolerance: Patient limited by pain    Pt presents due to a long history of significant low back pain c paresthesias, positive SLR test, positive slump test, diminished recall in RLE, decreased strength, very limited spinal AROM, decreased ability to participate in activities of choice, increased need for assistance from others, Revised Oswestry of 40%, indicating the need for PT services to return to PLOF.          Rehab potential: Fair          PLAN   Potentially just work on soft-tissue, manual next visit, if pain was increased significantly after this session.     Treatment/Interventions: Aquatic therapy, Cryotherapy, Dry needling, Education/ Instruction, Electrical stimulation, Gait training, Hot pack, Manual therapy, Mechanical traction, Neuromuscular re-education, Taping techniques, Therapeutic activities, Therapeutic exercises  PT Plan: Skilled PT  PT Frequency: 2 times per week (1-2x/week)  Duration: 6 weeks  Certification Period Start Date: 05/06/25  Rehab Potential: Fair  Plan of Care Agreement: Patient          Pt. Is in agreement with PT plan.     Goals:     Active       PT Problem       PT Goal 1       Start:  05/06/25    Expected End:  08/04/25       Pt will be compliant c HEP for continuity of POC, in 2 weeks.           PT Goal 2       Start:  05/06/25    Expected End:  08/04/25       Pt will have <=2/10 pain for one week, for improved QOL, in 4 weeks.           PT Goal 3       Start:   05/06/25    Expected End:  08/04/25       Pt will increase BLE and trunk strength to 4+/5 or greater for improved functional mobility and decreased risk of falls, in 6 weeks.           PT Goal 4       Start:  05/06/25    Expected End:  08/04/25       Pt will score 20% (MCID x2) on Revised Oswestry, indicating improved pain and functional ability, in 6 weeks.           Patient Stated Goal 1       Start:  05/06/25    Expected End:  08/04/25       Patient states that their goal is to be able to sleep, participate in hobbies, and work without significant pain with physical therapy intervention.

## 2025-05-19 ENCOUNTER — TREATMENT (OUTPATIENT)
Dept: PHYSICAL THERAPY | Facility: CLINIC | Age: 35
End: 2025-05-19
Payer: COMMERCIAL

## 2025-05-19 DIAGNOSIS — M54.16 LUMBAR RADICULOPATHY: ICD-10-CM

## 2025-05-19 PROCEDURE — 97140 MANUAL THERAPY 1/> REGIONS: CPT | Mod: GP,CQ

## 2025-05-19 PROCEDURE — 97110 THERAPEUTIC EXERCISES: CPT | Mod: GP,CQ

## 2025-05-19 ASSESSMENT — PAIN DESCRIPTION - DESCRIPTORS: DESCRIPTORS: ACHING;BURNING

## 2025-05-19 ASSESSMENT — PAIN - FUNCTIONAL ASSESSMENT: PAIN_FUNCTIONAL_ASSESSMENT: 0-10

## 2025-05-19 ASSESSMENT — PAIN SCALES - GENERAL: PAINLEVEL_OUTOF10: 5 - MODERATE PAIN

## 2025-05-27 ENCOUNTER — TREATMENT (OUTPATIENT)
Dept: PHYSICAL THERAPY | Facility: CLINIC | Age: 35
End: 2025-05-27
Payer: COMMERCIAL

## 2025-05-27 DIAGNOSIS — M54.16 LUMBAR RADICULOPATHY: ICD-10-CM

## 2025-05-27 PROCEDURE — 97140 MANUAL THERAPY 1/> REGIONS: CPT | Mod: GP

## 2025-05-27 PROCEDURE — 97110 THERAPEUTIC EXERCISES: CPT | Mod: GP

## 2025-05-27 ASSESSMENT — PAIN SCALES - GENERAL: PAINLEVEL_OUTOF10: 3

## 2025-05-27 ASSESSMENT — PAIN - FUNCTIONAL ASSESSMENT: PAIN_FUNCTIONAL_ASSESSMENT: 0-10

## 2025-05-27 ASSESSMENT — PAIN DESCRIPTION - DESCRIPTORS: DESCRIPTORS: ACHING;BURNING

## 2025-05-27 NOTE — PROGRESS NOTES
"Physical Therapy          Physical Therapy Treatment          Patient Name: Taylor Reinoso     MRN: [id number]     : 1990      Arturo Barrera     Today's Date: 2025     Time Calculation  Start Time: 1527  Stop Time: 1607  Time Calculation (min): 40 min     PT Therapeutic Procedures Time Entry  Manual Therapy Time Entry: 11  Therapeutic Exercise Time Entry: 29                       General     Reason for Referral: Back pain  Referred By: Arturo Barrera, APRN-CNP  General Comment: visit 3     Visit #3           Current Problem     Problem List Items Addressed This Visit    None  Visit Diagnoses         Codes      Lumbar radiculopathy     M54.16                             Subjective      Pt states \"I wasn't a big fan of you after the first session, but it's starting to feel better\".       Pt.'s name and  were confirmed this date.          Pt. Reports compliance with HEP.          Precautions     Precautions  Precautions Comment: Avoid peripheralization of symptoms, avoid forced/excessive flexion          Pain     Pain Assessment: 0-10  0-10 (Numeric) Pain Score: 3  Pain Type: Acute pain  Pain Location: Back  Pain Orientation: Right  Pain Radiating Towards: R buttock  Pain Descriptors: Aching, Burning  Pain Frequency: Constant/continuous          Objective           Pt's name and  were confirmed today.          Treatments:       Treatments:   Therapeutic exercise:   LTR (very small range) x5 each X painful  BKFO x10 each (very small range on R)   Open book x5   Wall spinal ext. X5 -affects right shoulder  Scap. Retraction x10  Supine piriformis stretches x3  20\" hold P  Min bridge x10  Shelf spinal ext. x10        Manual    Manual traction 2x60\"   Piriformis stretch 3x30\" on R   Glute med., QL, paraspinal STM x5'          PATIENT EDUCATION:   Outpatient Education  Individual(s) Educated: Patient  Education Provided: Anatomy, Body Mechanics, Home Exercise Program, Physiology, POC, Posture  Risk and " Benefits Discussed with Patient/Caregiver/Other: yes  Patient/Caregiver Demonstrated Understanding: yes  Plan of Care Discussed and Agreed Upon: yes  Patient Response to Education: Patient/Caregiver Verbalized Understanding of Information          Assessment:   Pt denies increased pain at conclusion.              Plan:     OP PT Plan  Treatment/Interventions: Aquatic therapy, Cryotherapy, Dry needling, Education/ Instruction, Electrical stimulation, Gait training, Hot pack, Manual therapy, Mechanical traction, Neuromuscular re-education, Taping techniques, Therapeutic activities, Therapeutic exercises  PT Plan: Skilled PT  PT Frequency: 2 times per week (1-2x/week)  Duration: 6 weeks  Certification Period Start Date: 05/06/25  Rehab Potential: Fair  Plan of Care Agreement: Patient          Goals:     Active       PT Problem       PT Goal 1       Start:  05/06/25    Expected End:  08/04/25       Pt will be compliant c HEP for continuity of POC, in 2 weeks.           PT Goal 2       Start:  05/06/25    Expected End:  08/04/25       Pt will have <=2/10 pain for one week, for improved QOL, in 4 weeks.           PT Goal 3       Start:  05/06/25    Expected End:  08/04/25       Pt will increase BLE and trunk strength to 4+/5 or greater for improved functional mobility and decreased risk of falls, in 6 weeks.           PT Goal 4       Start:  05/06/25    Expected End:  08/04/25       Pt will score 20% (MCID x2) on Revised Oswestry, indicating improved pain and functional ability, in 6 weeks.           Patient Stated Goal 1       Start:  05/06/25    Expected End:  08/04/25       Patient states that their goal is to be able to sleep, participate in hobbies, and work without significant pain with physical therapy intervention.

## 2025-06-03 ENCOUNTER — TREATMENT (OUTPATIENT)
Dept: PHYSICAL THERAPY | Facility: CLINIC | Age: 35
End: 2025-06-03
Payer: COMMERCIAL

## 2025-06-03 DIAGNOSIS — M54.16 LUMBAR RADICULOPATHY: ICD-10-CM

## 2025-06-03 PROCEDURE — 97110 THERAPEUTIC EXERCISES: CPT | Mod: GP

## 2025-06-03 PROCEDURE — 97140 MANUAL THERAPY 1/> REGIONS: CPT | Mod: GP

## 2025-06-03 ASSESSMENT — PAIN SCALES - GENERAL: PAINLEVEL_OUTOF10: 3

## 2025-06-03 ASSESSMENT — PAIN DESCRIPTION - DESCRIPTORS: DESCRIPTORS: ACHING;BURNING

## 2025-06-03 ASSESSMENT — PAIN - FUNCTIONAL ASSESSMENT: PAIN_FUNCTIONAL_ASSESSMENT: 0-10

## 2025-06-03 NOTE — PROGRESS NOTES
"Physical Therapy          Physical Therapy Treatment          Patient Name: Taylor Reinoso     MRN: [id number]     : 1990      Arturo Barrera     Today's Date: 6/3/2025     Time Calculation  Start Time: 1700  Stop Time: 1745  Time Calculation (min): 45 min     PT Therapeutic Procedures Time Entry  Manual Therapy Time Entry: 15  Therapeutic Exercise Time Entry: 30                       General     Reason for Referral: Back pain  Referred By: Arturo Barrera APRN-CNP  General Comment: visit 4     Visit #4           Current Problem     Problem List Items Addressed This Visit           ICD-10-CM       Neuro    Lumbar radiculopathy M54.16                 Subjective      Pt reports that she had two days last week that she had significant pain again, though reports less pain overall since starting PT.       Pt.'s name and  were confirmed this date.          Pt. Reports compliance with HEP.          Precautions     Precautions  Precautions Comment: Avoid peripheralization of symptoms, avoid forced/excessive flexion          Pain     Pain Assessment: 0-10  0-10 (Numeric) Pain Score: 3  Pain Type: Acute pain  Pain Location: Back  Pain Orientation: Right  Pain Radiating Towards: R buttock  Pain Descriptors: Aching, Burning  Pain Frequency: Constant/continuous          Objective        Pt's name and  were confirmed today.          Treatments:     Therapeutic exercise   Step-one x5' for tissue extensibility  LTR (very small range) x5 each X painful  BKFO x10 each (very small range on R)   Open book x5   Wall spinal ext. X5 -affects right shoulder  Scap. Retraction x10  Supine piriformis stretches x3  20\"  Mini bridge x10  Shelf spinal ext. x10        Manual    Manual traction 2x60\"   Piriformis stretch 3x30\" on R   Glute med., QL, paraspinal STM x5'        PATIENT EDUCATION:   Outpatient Education  Individual(s) Educated: Patient  Education Provided: Anatomy, Body Mechanics, Home Exercise Program, Physiology, POC, " Posture  Risk and Benefits Discussed with Patient/Caregiver/Other: yes  Patient/Caregiver Demonstrated Understanding: yes  Plan of Care Discussed and Agreed Upon: yes  Patient Response to Education: Patient/Caregiver Verbalized Understanding of Information          Assessment:   Pt reports pain c hook-lying. Pt reports pain of 5/10 at conclusion due to lying on table.           Plan:     OP PT Plan  Treatment/Interventions: Aquatic therapy, Cryotherapy, Dry needling, Education/ Instruction, Electrical stimulation, Gait training, Hot pack, Manual therapy, Mechanical traction, Neuromuscular re-education, Taping techniques, Therapeutic activities, Therapeutic exercises  PT Plan: Skilled PT  PT Frequency: 2 times per week (1-2x/week)  Duration: 6 weeks  Certification Period Start Date: 05/06/25  Rehab Potential: Fair  Plan of Care Agreement: Patient          Goals:     Active       PT Problem       PT Goal 1       Start:  05/06/25    Expected End:  08/04/25       Pt will be compliant c HEP for continuity of POC, in 2 weeks.           PT Goal 2       Start:  05/06/25    Expected End:  08/04/25       Pt will have <=2/10 pain for one week, for improved QOL, in 4 weeks.           PT Goal 3       Start:  05/06/25    Expected End:  08/04/25       Pt will increase BLE and trunk strength to 4+/5 or greater for improved functional mobility and decreased risk of falls, in 6 weeks.           PT Goal 4       Start:  05/06/25    Expected End:  08/04/25       Pt will score 20% (MCID x2) on Revised Oswestry, indicating improved pain and functional ability, in 6 weeks.           Patient Stated Goal 1       Start:  05/06/25    Expected End:  08/04/25       Patient states that their goal is to be able to sleep, participate in hobbies, and work without significant pain with physical therapy intervention.

## 2025-06-10 ENCOUNTER — TREATMENT (OUTPATIENT)
Dept: PHYSICAL THERAPY | Facility: CLINIC | Age: 35
End: 2025-06-10
Payer: COMMERCIAL

## 2025-06-10 DIAGNOSIS — M54.16 LUMBAR RADICULOPATHY: ICD-10-CM

## 2025-06-10 PROCEDURE — 97140 MANUAL THERAPY 1/> REGIONS: CPT | Mod: GP

## 2025-06-10 PROCEDURE — 97110 THERAPEUTIC EXERCISES: CPT | Mod: GP

## 2025-06-10 ASSESSMENT — PAIN DESCRIPTION - DESCRIPTORS: DESCRIPTORS: ACHING;BURNING

## 2025-06-10 ASSESSMENT — PAIN SCALES - GENERAL: PAINLEVEL_OUTOF10: 4

## 2025-06-10 ASSESSMENT — PAIN - FUNCTIONAL ASSESSMENT: PAIN_FUNCTIONAL_ASSESSMENT: 0-10

## 2025-06-10 NOTE — PROGRESS NOTES
"Physical Therapy          Physical Therapy Treatment          Patient Name: Taylor Reinoso     MRN: [id number]     : 1990      Arturo Barrera     Today's Date: 6/10/2025     Time Calculation  Start Time: 1655  Stop Time: 1740  Time Calculation (min): 45 min     PT Therapeutic Procedures Time Entry  Manual Therapy Time Entry: 15  Therapeutic Exercise Time Entry: 30                       General     Reason for Referral: Back pain  Referred By: Arturo Barrera APRN-CNP  General Comment: visit 5     Visit #5           Current Problem     Problem List Items Addressed This Visit           ICD-10-CM       Neuro    Lumbar radiculopathy M54.16                Subjective      PT states \"It has been an up and down week.\"      Pt.'s name and  were confirmed this date.          Pt. Reports compliance with HEP.          Precautions     Precautions  Precautions Comment: Avoid peripheralization of symptoms, avoid forced/excessive flexion          Pain     Pain Assessment: 0-10  0-10 (Numeric) Pain Score: 4  Pain Type: Acute pain  Pain Location: Back  Pain Orientation: Right  Pain Radiating Towards: R buttock  Pain Descriptors: Aching, Burning  Pain Frequency: Constant/continuous          Objective           Pt's name and  were confirmed today.          Treatments:     Therapeutic exercise   Step-one x5' for tissue extensibility  LTR x10  BKFO x10 each   Open book x5   Wall spinal ext. X5 -affects right shoulder  Scap. Retraction x10  Mini bridge x10  Shelf spinal ext. X10  Clam shells 3x10  Side-lying abd. 2x10     Manual    Manual traction 3x60\"   Piriformis stretch 3x30\" on R   Glute med., QL, paraspinal STM x5'        PATIENT EDUCATION:   Outpatient Education  Individual(s) Educated: Patient  Education Provided: Anatomy, Body Mechanics, Home Exercise Program, Physiology, POC, Posture  Risk and Benefits Discussed with Patient/Caregiver/Other: yes  Patient/Caregiver Demonstrated Understanding: yes  Plan of Care Discussed " "and Agreed Upon: yes  Patient Response to Education: Patient/Caregiver Verbalized Understanding of Information          Assessment:   Pt states \"I actually don't feel too bad\" at conclusion.           Plan:     OP PT Plan  Treatment/Interventions: Aquatic therapy, Cryotherapy, Dry needling, Education/ Instruction, Electrical stimulation, Gait training, Hot pack, Manual therapy, Mechanical traction, Neuromuscular re-education, Taping techniques, Therapeutic activities, Therapeutic exercises  PT Plan: Skilled PT  PT Frequency: 2 times per week (1-2x/week)  Duration: 6 weeks  Certification Period Start Date: 05/06/25  Rehab Potential: Fair  Plan of Care Agreement: Patient          Goals:     Active       PT Problem       PT Goal 1       Start:  05/06/25    Expected End:  08/04/25       Pt will be compliant c HEP for continuity of POC, in 2 weeks.           PT Goal 2       Start:  05/06/25    Expected End:  08/04/25       Pt will have <=2/10 pain for one week, for improved QOL, in 4 weeks.           PT Goal 3       Start:  05/06/25    Expected End:  08/04/25       Pt will increase BLE and trunk strength to 4+/5 or greater for improved functional mobility and decreased risk of falls, in 6 weeks.           PT Goal 4       Start:  05/06/25    Expected End:  08/04/25       Pt will score 20% (MCID x2) on Revised Oswestry, indicating improved pain and functional ability, in 6 weeks.           Patient Stated Goal 1       Start:  05/06/25    Expected End:  08/04/25       Patient states that their goal is to be able to sleep, participate in hobbies, and work without significant pain with physical therapy intervention.               "

## 2025-06-24 ENCOUNTER — APPOINTMENT (OUTPATIENT)
Dept: PRIMARY CARE | Facility: CLINIC | Age: 35
End: 2025-06-24
Payer: COMMERCIAL

## 2025-06-24 VITALS
DIASTOLIC BLOOD PRESSURE: 60 MMHG | WEIGHT: 165.1 LBS | BODY MASS INDEX: 30.38 KG/M2 | OXYGEN SATURATION: 97 % | HEART RATE: 65 BPM | HEIGHT: 62 IN | SYSTOLIC BLOOD PRESSURE: 124 MMHG

## 2025-06-24 DIAGNOSIS — M54.16 LUMBAR RADICULOPATHY: Primary | ICD-10-CM

## 2025-06-24 PROCEDURE — 1036F TOBACCO NON-USER: CPT

## 2025-06-24 PROCEDURE — 99214 OFFICE O/P EST MOD 30 MIN: CPT

## 2025-06-24 PROCEDURE — 3008F BODY MASS INDEX DOCD: CPT

## 2025-06-24 RX ORDER — CYCLOBENZAPRINE HCL 10 MG
10 TABLET ORAL NIGHTLY PRN
Qty: 20 TABLET | Refills: 0 | Status: SHIPPED | OUTPATIENT
Start: 2025-06-24 | End: 2025-08-23

## 2025-06-24 ASSESSMENT — ENCOUNTER SYMPTOMS
SHORTNESS OF BREATH: 0
CONSTIPATION: 0
NERVOUS/ANXIOUS: 0
POLYDIPSIA: 0
POLYPHAGIA: 0
TROUBLE SWALLOWING: 0
HEADACHES: 0
FATIGUE: 0
WEAKNESS: 0
CHILLS: 0
FREQUENCY: 0
DIARRHEA: 0
BACK PAIN: 1
RECTAL PAIN: 0
DIAPHORESIS: 0
NUMBNESS: 0
DIFFICULTY URINATING: 0
ABDOMINAL PAIN: 0
WOUND: 0
ARTHRALGIAS: 0
CHEST TIGHTNESS: 0
PALPITATIONS: 0
MYALGIAS: 0
UNEXPECTED WEIGHT CHANGE: 0
NAUSEA: 0

## 2025-06-24 NOTE — PROGRESS NOTES
"Subjective   Patient ID: Taylor Reinoso is a 35 y.o. female who presents for Follow-up (Pt is here today to discuss next steps as her PT recommended doing a follow up to find out what next steps are as he has expressed concern about her back. ).    Patient presents today to discuss evaluation of back pain.    Lumbar radiculopathy: Has undergone multiple weeks of physical therapy without improvement.  Physical therapy has actually increased her pain significantly.  Will prescribe her Flexeril for muscle spasms today.  She is still having lumbar radiculopathy that radiates into her right buttock, to her right medial thigh and then all the way to her right toe.  She has had no loss of bowel or bladder function.  Has worsening pain with sitting long times.  X-rays were normal.  She will need further evaluation via MRI of her spine and referral to orthopedics for possible surgical intervention.  Sending her to Dr. Capellan in [facility] which is close to her home.         Review of Systems   Constitutional:  Negative for chills, diaphoresis, fatigue and unexpected weight change.   HENT:  Negative for dental problem, tinnitus and trouble swallowing.    Eyes:  Negative for visual disturbance.   Respiratory:  Negative for chest tightness and shortness of breath.    Cardiovascular:  Negative for chest pain, palpitations and leg swelling.   Gastrointestinal:  Negative for abdominal pain, constipation, diarrhea, nausea and rectal pain.   Endocrine: Negative for polydipsia, polyphagia and polyuria.   Genitourinary:  Negative for difficulty urinating, frequency and urgency.   Musculoskeletal:  Positive for back pain. Negative for arthralgias and myalgias.   Skin:  Negative for pallor and wound.   Neurological:  Negative for syncope, weakness, numbness and headaches.   Psychiatric/Behavioral:  Negative for suicidal ideas. The patient is not nervous/anxious.        Objective   /60   Pulse 65   Ht 1.565 m (5' 1.6\")   Wt " 74.9 kg (165 lb 1.6 oz)   SpO2 97%   BMI 30.59 kg/m²     Physical Exam  Vitals and nursing note reviewed.   Constitutional:       General: She is not in acute distress.     Appearance: Normal appearance.   HENT:      Head: Normocephalic.      Nose: Nose normal.      Mouth/Throat:      Mouth: Mucous membranes are moist.      Pharynx: Oropharynx is clear.   Eyes:      General: No scleral icterus.     Pupils: Pupils are equal, round, and reactive to light.   Neck:      Vascular: No carotid bruit.   Cardiovascular:      Rate and Rhythm: Normal rate and regular rhythm.      Pulses: Normal pulses.      Heart sounds: Normal heart sounds. No murmur heard.  Pulmonary:      Effort: Pulmonary effort is normal. No respiratory distress.      Breath sounds: Normal breath sounds. No stridor. No wheezing, rhonchi or rales.   Abdominal:      General: Bowel sounds are normal. There is no distension.      Palpations: Abdomen is soft.      Tenderness: There is no abdominal tenderness. There is no right CVA tenderness or left CVA tenderness.   Musculoskeletal:         General: Tenderness (Right lower back and right buttock) present. No swelling. Normal range of motion.      Cervical back: Normal range of motion.      Right lower leg: No edema.      Left lower leg: No edema.   Skin:     General: Skin is warm and dry.      Capillary Refill: Capillary refill takes less than 2 seconds.   Neurological:      General: No focal deficit present.      Mental Status: She is alert and oriented to person, place, and time. Mental status is at baseline.   Psychiatric:         Mood and Affect: Mood normal.         Behavior: Behavior normal.         Thought Content: Thought content normal.         Judgment: Judgment normal.         Assessment/Plan   Diagnoses and all orders for this visit:  Lumbar radiculopathy  -     cyclobenzaprine (Flexeril) 10 mg tablet; Take 1 tablet (10 mg) by mouth as needed at bedtime for muscle spasms.  -     MR lumbar spine  w and wo IV contrast; Future  -     Referral to Orthopedics and Sports Medicine; Future

## 2025-07-03 ENCOUNTER — APPOINTMENT (OUTPATIENT)
Dept: RADIOLOGY | Facility: HOSPITAL | Age: 35
End: 2025-07-03
Payer: COMMERCIAL

## 2025-07-08 ENCOUNTER — HOSPITAL ENCOUNTER (OUTPATIENT)
Dept: RADIOLOGY | Facility: HOSPITAL | Age: 35
Discharge: HOME | End: 2025-07-08
Payer: COMMERCIAL

## 2025-09-02 ENCOUNTER — OFFICE VISIT (OUTPATIENT)
Dept: URGENT CARE | Facility: CLINIC | Age: 35
End: 2025-09-02
Payer: COMMERCIAL

## 2025-09-02 VITALS
RESPIRATION RATE: 16 BRPM | OXYGEN SATURATION: 96 % | TEMPERATURE: 97.6 F | SYSTOLIC BLOOD PRESSURE: 93 MMHG | HEART RATE: 84 BPM | DIASTOLIC BLOOD PRESSURE: 60 MMHG

## 2025-09-02 DIAGNOSIS — H92.01 RIGHT EAR PAIN: ICD-10-CM

## 2025-09-02 DIAGNOSIS — K08.89 DENTALGIA: Primary | ICD-10-CM

## 2025-09-02 PROCEDURE — 96372 THER/PROPH/DIAG INJ SC/IM: CPT | Performed by: PHYSICIAN ASSISTANT

## 2025-09-02 PROCEDURE — 2500000004 HC RX 250 GENERAL PHARMACY W/ HCPCS (ALT 636 FOR OP/ED): Mod: JZ | Performed by: PHYSICIAN ASSISTANT

## 2025-09-02 PROCEDURE — 99212 OFFICE O/P EST SF 10 MIN: CPT | Mod: 25 | Performed by: PHYSICIAN ASSISTANT

## 2025-09-02 RX ORDER — PENICILLIN V POTASSIUM 500 MG/1
500 TABLET, FILM COATED ORAL 3 TIMES DAILY
Qty: 30 TABLET | Refills: 0 | Status: SHIPPED | OUTPATIENT
Start: 2025-09-02 | End: 2025-09-12

## 2025-09-02 RX ORDER — KETOROLAC TROMETHAMINE 30 MG/ML
30 INJECTION, SOLUTION INTRAMUSCULAR; INTRAVENOUS ONCE
Status: COMPLETED | OUTPATIENT
Start: 2025-09-02 | End: 2025-09-02

## 2025-09-02 RX ADMIN — KETOROLAC TROMETHAMINE 30 MG: 30 INJECTION, SOLUTION INTRAMUSCULAR at 15:41

## 2025-09-26 ENCOUNTER — APPOINTMENT (OUTPATIENT)
Dept: PRIMARY CARE | Facility: CLINIC | Age: 35
End: 2025-09-26
Payer: COMMERCIAL

## 2026-01-15 ENCOUNTER — APPOINTMENT (OUTPATIENT)
Dept: PRIMARY CARE | Facility: CLINIC | Age: 36
End: 2026-01-15
Payer: COMMERCIAL